# Patient Record
Sex: FEMALE | URBAN - METROPOLITAN AREA
[De-identification: names, ages, dates, MRNs, and addresses within clinical notes are randomized per-mention and may not be internally consistent; named-entity substitution may affect disease eponyms.]

---

## 2017-02-08 PROBLEM — R56.9 SEIZURE (HCC): Status: ACTIVE | Noted: 2017-02-08

## 2017-02-20 ENCOUNTER — OFFICE VISIT (OUTPATIENT)
Dept: PHYSICAL THERAPY | Age: 42
End: 2017-02-20
Attending: Other
Payer: COMMERCIAL

## 2017-02-20 DIAGNOSIS — I63.321 CEREBROVASCULAR ACCIDENT (CVA) DUE TO THROMBOSIS OF RIGHT ANTERIOR CEREBRAL ARTERY (HCC): Primary | ICD-10-CM

## 2017-02-20 DIAGNOSIS — R56.9 SEIZURE (HCC): ICD-10-CM

## 2017-02-20 DIAGNOSIS — I60.7 CEREBRAL ANEURYSM RUPTURE (HCC): ICD-10-CM

## 2017-02-20 PROCEDURE — 97163 PT EVAL HIGH COMPLEX 45 MIN: CPT

## 2017-02-20 PROCEDURE — 97530 THERAPEUTIC ACTIVITIES: CPT

## 2017-02-20 NOTE — PROGRESS NOTES
NEUROLOGICAL EVALUATION:   Referring Physician: Dr. Ra Salazar  Diagnosis: L UE/LE weakness and impaired gait s/p CVA     Date of Service: 2/20/2017     PATIENT SUMMARY   Edgar Rogers is a 37year old y/o female who presents to therapy today following R ant improve strength and stability with walking, bed mobility, and taking care of her housework. Past medical history was reviewed with Harika.  Significant findings include high BP, angina, depression, blood clots, hemianopsia, anemia, gallbladder problems, w Assistance   Bed Mobility MOD I with increased time to perform and compensatory behavior to control L LE   Supine --> Sit MOD I with increased time    Sit --> Supine MOD I with trunk ROT to R to pull up L LE   Sit --> Stand MOD I with KAFO with INC R wt sh visits)  · Pt will improve functional hip strength to demonstrate ability to ascend/descend 1 flight of stairs reciprocally with KAFO (12 visits)  · Pt will be independent and compliant with comprehensive HEP to maintain progress achieved in PT (12 visits)

## 2017-02-23 ENCOUNTER — APPOINTMENT (OUTPATIENT)
Dept: PHYSICAL THERAPY | Age: 42
End: 2017-02-23
Attending: Other
Payer: COMMERCIAL

## 2017-02-28 ENCOUNTER — OFFICE VISIT (OUTPATIENT)
Dept: PHYSICAL THERAPY | Age: 42
End: 2017-02-28
Attending: Other
Payer: COMMERCIAL

## 2017-02-28 PROCEDURE — 97116 GAIT TRAINING THERAPY: CPT

## 2017-02-28 PROCEDURE — 97110 THERAPEUTIC EXERCISES: CPT

## 2017-02-28 PROCEDURE — 97112 NEUROMUSCULAR REEDUCATION: CPT

## 2017-02-28 NOTE — PROGRESS NOTES
Dx: L UE/LE weakness and impaired gait s/p CVA Oct 2016         Authorized # of Visits:  HMO ([id number])         Next MD visit: couple months  Fall Risk: standard         Precautions: n/a             Subjective: Feeling ok today.     Objective: MMT L ankle DF 1/5 strengthening without KAFO. Progress gait training without KAFO    Date: 2/28/2017  Tx#: 2/12 Date: Tx#: 3/ Date: Tx#: 4/ Date: Tx#: 5/ Date: Tx#: 6/ Date: Tx#: 7/ Date:    Tx#: 8/   Gait on treadmill with KAFO 1.1 mph x3 min         4 inch step u

## 2017-03-08 ENCOUNTER — OFFICE VISIT (OUTPATIENT)
Dept: PHYSICAL THERAPY | Age: 42
End: 2017-03-08
Attending: Other
Payer: COMMERCIAL

## 2017-03-08 PROCEDURE — 97014 ELECTRIC STIMULATION THERAPY: CPT

## 2017-03-08 PROCEDURE — 97110 THERAPEUTIC EXERCISES: CPT

## 2017-03-08 PROCEDURE — 97112 NEUROMUSCULAR REEDUCATION: CPT

## 2017-03-08 PROCEDURE — 97116 GAIT TRAINING THERAPY: CPT

## 2017-03-08 NOTE — PROGRESS NOTES
Dx: L UE/LE weakness and impaired gait s/p CVA Oct 2016         Authorized # of Visits:  HMO ([id number])         Next MD visit: couple months  Fall Risk: standard         Precautions: n/a             Subjective: Felt ok after last PT session; not too sore or tire maintain progress achieved in PT (12 visits) -progress    Plan: progress gait and L LE strengthening as able. Date: 2/28/2017  Tx#: 2/12 Date: 3/8/2017  Tx#: 3/12 Date: Tx#: 4/ Date: Tx#: 5/ Date: Tx#: 6/ Date: Tx#: 7/ Date:    Tx#: 8/   Gait on

## 2017-03-09 ENCOUNTER — OFFICE VISIT (OUTPATIENT)
Dept: PHYSICAL THERAPY | Age: 42
End: 2017-03-09
Attending: Other
Payer: COMMERCIAL

## 2017-03-09 PROCEDURE — 97032 APPL MODALITY 1+ESTIM EA 15: CPT

## 2017-03-09 PROCEDURE — 97112 NEUROMUSCULAR REEDUCATION: CPT

## 2017-03-09 PROCEDURE — 97116 GAIT TRAINING THERAPY: CPT

## 2017-03-09 PROCEDURE — 97110 THERAPEUTIC EXERCISES: CPT

## 2017-03-09 NOTE — PROGRESS NOTES
Dx: L UE/LE weakness and impaired gait s/p CVA Oct 2016         Authorized # of Visits:  HMO ([id number])         Next MD visit: couple months  Fall Risk: standard         Precautions: n/a             Subjective: Was not sore after yesterday's PT session.      Primo Martinez 2/28/2017  Tx#: 2/12 Date: 3/8/2017  Tx#: 3/12 Date: 3/9/2017  Tx#: 4/12 Date: Tx#: 5/ Date: Tx#: 6/ Date: Tx#: 7/ Date:    Tx#: 8/   Gait on treadmill with KAFO 1.1 mph x3 min Gait training without KAFO - 50 ft x 2 laps (SBA)  -high knees x50 ft  -bu with lunges onto airex. Continued body awareness training with mirror feedback with sit<>stands and tactile cuing at the hips and shoulders to avoid R wt shift/trunk lean.  Continued FNMES for anterior tib retraining  Reviewed HEP* with lateral stepping RTB

## 2017-03-13 ENCOUNTER — OFFICE VISIT (OUTPATIENT)
Dept: PHYSICAL THERAPY | Age: 42
End: 2017-03-13
Attending: Other
Payer: COMMERCIAL

## 2017-03-13 PROCEDURE — 97116 GAIT TRAINING THERAPY: CPT

## 2017-03-13 PROCEDURE — 97530 THERAPEUTIC ACTIVITIES: CPT

## 2017-03-13 PROCEDURE — 97110 THERAPEUTIC EXERCISES: CPT

## 2017-03-13 PROCEDURE — 97112 NEUROMUSCULAR REEDUCATION: CPT

## 2017-03-13 NOTE — PROGRESS NOTES
Dx: L UE/LE weakness and impaired gait s/p CVA Oct 2016         Authorized # of Visits:  HMO ([id number])         Next MD visit: couple months  Fall Risk: standard         Precautions: n/a             Subjective: Feels a little stronger with walking around the [employer] after her 1year old without loss of stability (8 visits) -progress  · Pt will improve L shld strength to 4+/5 to be able to lift heavy pans in the kitchen without difficulty (12 visits) -progress  · Pt will improve functional hip strength to demonstrate a 8 inch DF CKC TC glide and calf stretch x15 (able to bring knee 3 finger past toes, but knee sore)  -with RTB L knee ABD x10 Tandem stance 2x30s ea (SBA) Clams 2x10 L min A 2x10 ea      RTB at knees side step //bars with slight knee bend x2 laps* x2 lap

## 2017-03-16 ENCOUNTER — OFFICE VISIT (OUTPATIENT)
Dept: PHYSICAL THERAPY | Age: 42
End: 2017-03-16
Attending: Other
Payer: COMMERCIAL

## 2017-03-16 PROCEDURE — 97110 THERAPEUTIC EXERCISES: CPT

## 2017-03-16 PROCEDURE — 97112 NEUROMUSCULAR REEDUCATION: CPT

## 2017-03-16 PROCEDURE — 97032 APPL MODALITY 1+ESTIM EA 15: CPT

## 2017-03-16 NOTE — PROGRESS NOTES
Dx: L UE/LE weakness and impaired gait s/p CVA Oct 2016         Authorized # of Visits:  HMO (12)         Next MD visit: couple months  Fall Risk: standard         Precautions: n/a             Subjective: Pt is feeling good today.  Yesterday she had a very reciprocally with KAFO (12 visits) -progress  · Pt will be independent and compliant with comprehensive HEP to maintain progress achieved in PT (12 visits) -progress    Plan: review home FNMES use     Date: 2/28/2017  Tx#: 2/12 Date: 3/8/2017  Tx#: 3/12 Lenny min A 2x10 ea -     RTB at knees side step //bars with slight knee bend x2 laps* x2 laps SB wall squat with GTB at knees x8 (poor response) - -     slant board L foot tap up x15 (for functional DF) [place]alese L anterior tib with AROM DF 50-10 cycle (22 intens

## 2017-03-23 ENCOUNTER — OFFICE VISIT (OUTPATIENT)
Dept: PHYSICAL THERAPY | Age: 42
End: 2017-03-23
Attending: Other
Payer: COMMERCIAL

## 2017-03-23 PROCEDURE — 97112 NEUROMUSCULAR REEDUCATION: CPT

## 2017-03-23 PROCEDURE — 97110 THERAPEUTIC EXERCISES: CPT

## 2017-03-23 PROCEDURE — 97116 GAIT TRAINING THERAPY: CPT

## 2017-03-23 NOTE — PROGRESS NOTES
Dx: L UE/LE weakness and impaired gait s/p CVA Oct 2016         Authorized # of Visits:  HMO ([id number])         Next MD visit: couple months  Fall Risk: standard         Precautions: n/a             Subjective: Sometimes feels good; sometimes not good (dizziness demonstrate ability to ascend/descend 1 flight of stairs reciprocally with KAFO (12 visits) -progress  · Pt will be independent and compliant with comprehensive HEP to maintain progress achieved in PT (12 visits) -progress    Plan: review updated HEP; asse curl x10 Sit<>stand with ball toss x15 (CGA) Single leg sit<>stand from elevated plinth ([facility]) x8 ea (toe touch L)    8 inch DF CKC TC glide and calf stretch x15 (able to bring knee 3 finger past toes, but knee sore)  -with RTB L knee ABD x10 Tandem stance

## 2017-03-27 ENCOUNTER — OFFICE VISIT (OUTPATIENT)
Dept: PHYSICAL THERAPY | Age: 42
End: 2017-03-27
Attending: Other
Payer: COMMERCIAL

## 2017-03-27 NOTE — PROGRESS NOTES
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 Pt and  report understanding. No further therapy provided.

## 2017-03-29 ENCOUNTER — OFFICE VISIT (OUTPATIENT)
Dept: PHYSICAL THERAPY | Age: 42
End: 2017-03-29
Attending: Other
Payer: COMMERCIAL

## 2017-03-29 PROCEDURE — 97110 THERAPEUTIC EXERCISES: CPT

## 2017-03-29 PROCEDURE — 97116 GAIT TRAINING THERAPY: CPT

## 2017-03-29 PROCEDURE — 97112 NEUROMUSCULAR REEDUCATION: CPT

## 2017-03-29 NOTE — PROGRESS NOTES
Dx: L UE/LE weakness and impaired gait s/p CVA Oct 2016         Authorized # of Visits:  HMO ([id number])         Next MD visit: April 19, 2017 (neurologist)  Fall Risk: standard         Precautions: CVA of R anterior cerebral artery            Subjective: Pt repo Pt does demonstrate significant improvement in L LE stability with gait; no evidence of L knee buckling or foot drop with gait; decreased fluidity of control with increased in gait speed. Continues to have decreased L LE proprioception with cone taps, sit<> Standing LINDSAY heel raise x10 Gait over airex x2 and 4 inch step -x2 laps 6 inch step up 2 UE L lead without KAFO x8 FWD x10 L  -Step down x10 R lead  -LAT up and over x6 laps - Stairs  1 flight x2 with 1 HR, reciprocal; SBA   4 inch no KAFO 2 UE  -step up L CKC) Gait with cone taps (6) x2 laps (CGA) (difficulty with L LE foot placement/proprioception)   slant board L foot tap up x15 (for functional DF) [place] L anterior tib with AROM DF 50-10 cycle (22 intensity) x12 min 30-10 cycle x15 min (able to raise up

## 2017-03-30 ENCOUNTER — TELEPHONE (OUTPATIENT)
Dept: PHYSICAL THERAPY | Age: 42
End: 2017-03-30

## 2017-03-30 ENCOUNTER — APPOINTMENT (OUTPATIENT)
Dept: PHYSICAL THERAPY | Age: 42
End: 2017-03-30
Attending: Other
Payer: COMMERCIAL

## 2017-03-30 NOTE — TELEPHONE ENCOUNTER
Attempted to call Dr. Ishmael Pineda when pt was present during last 2 PT sessions; but both dates MD office was closed and does not have answering service.  Per pt request, called to discuss pt's continued high resting BP despite current medication regime and pt r

## 2017-04-03 ENCOUNTER — TELEPHONE (OUTPATIENT)
Dept: PHYSICAL THERAPY | Age: 42
End: 2017-04-03

## 2017-04-03 NOTE — TELEPHONE ENCOUNTER
Pt did not show up to PT appt. Called to follow up. Pt's  reports she was told by the PCP office to hold therapy until after her office appt with PCP tomorrow.  Pt was under the impression that doctor's office had given the message to PT.

## 2017-04-03 NOTE — TELEPHONE ENCOUNTER
Per neurologist office, Dr. Mike Peterson is out of the office until April 6 - therapist and pt should discuss BP issues with PCP office. Called Dr. Gregg Friend office, spoke with Jacy Boo.  Jacy Boo reports she will discuss the issue with Dr. José Antonio Mckinney and try to get pt into

## 2017-04-05 ENCOUNTER — APPOINTMENT (OUTPATIENT)
Dept: PHYSICAL THERAPY | Age: 42
End: 2017-04-05
Attending: Other
Payer: COMMERCIAL

## 2017-04-05 ENCOUNTER — TELEPHONE (OUTPATIENT)
Dept: PHYSICAL THERAPY | Age: 42
End: 2017-04-05

## 2017-04-05 NOTE — TELEPHONE ENCOUNTER
Yael Bloom from PCP office called to follow up; left message stating that Sherita Parish was seen in their office yesterday for her BP. Per Yael Bloom, pt will hold PT this week, but return next week.

## 2017-04-10 ENCOUNTER — TELEPHONE (OUTPATIENT)
Dept: PHYSICAL THERAPY | Age: 42
End: 2017-04-10

## 2017-04-10 NOTE — TELEPHONE ENCOUNTER
No show. Called to follow up/confirm next visit. Pt's  reports they thought that they were not supposed to attend therapy this week due to her high blood pressure.  They thought they were supposed to wait for PCP office to call and clear her for ther

## 2017-04-12 ENCOUNTER — TELEPHONE (OUTPATIENT)
Dept: PHYSICAL THERAPY | Age: 42
End: 2017-04-12

## 2017-04-12 ENCOUNTER — OFFICE VISIT (OUTPATIENT)
Dept: PHYSICAL THERAPY | Age: 42
End: 2017-04-12
Attending: Other
Payer: COMMERCIAL

## 2017-04-12 PROCEDURE — 97116 GAIT TRAINING THERAPY: CPT

## 2017-04-12 PROCEDURE — 97530 THERAPEUTIC ACTIVITIES: CPT

## 2017-04-12 PROCEDURE — 97112 NEUROMUSCULAR REEDUCATION: CPT

## 2017-04-12 NOTE — TELEPHONE ENCOUNTER
Attempted to call PCP office back to discuss pt's recent BP issues and any precautions to exercise. Staff is not in the office on Wed.

## 2017-04-12 NOTE — TELEPHONE ENCOUNTER
Cathleen Moya from Dr. Velasquez Garcia office left message. Pt had misunderstood that she was able to return to PT this week. Cathleen Moya reports the pt did state her BP was high over the weekend, but could not recall the numbers from the reading.  Cathleen Moya  Will try to get in t

## 2017-04-12 NOTE — PROGRESS NOTES
Dx: L UE/LE weakness and impaired gait s/p CVA Oct 2016         Authorized # of Visits:  HMO (12)         Next MD visit: April 19, 2017 (neurologist)  Fall Risk: standard         Precautions: CVA of R anterior cerebral artery            Subjective: PCP off pinching sensation near L eye. Happens sometimes at home with picking up stuff from the ground; improves with standing rest. Pt demonstrates significant improvement in symmetry with squat form and functional lifting; minimal R hip shift.  Continues to have lap      -x50 ft - Treadmill 1.1 mph>1.5 mph 2 UE x4 min (faitgue) - 1.4 mph x3 min   4 inch step up with KAFO and 2 UE x6 Standing LINDSAY heel raise x10 Gait over airex x2 and 4 inch step -x2 laps 6 inch step up 2 UE L lead without KAFO x8 FWD x10 L  -Step d knee 3 finger past toes, but knee sore)  -with RTB L knee ABD x10 Tandem stance 2x30s ea (SBA) Clams 2x10 L min A 2x10 ea - 4 inch tap down L 2 UE* x10  -1 UE 1x10 ea (fair) - Gait on color circles x5 min -various positions (wide to narrow GUTIERREZ)   RTB at kn

## 2017-04-13 ENCOUNTER — APPOINTMENT (OUTPATIENT)
Dept: PHYSICAL THERAPY | Age: 42
End: 2017-04-13
Attending: Other
Payer: COMMERCIAL

## 2017-04-20 ENCOUNTER — APPOINTMENT (OUTPATIENT)
Dept: PHYSICAL THERAPY | Age: 42
End: 2017-04-20
Attending: Other
Payer: COMMERCIAL

## 2017-04-27 ENCOUNTER — APPOINTMENT (OUTPATIENT)
Dept: PHYSICAL THERAPY | Age: 42
End: 2017-04-27
Attending: Other
Payer: COMMERCIAL

## 2017-05-15 ENCOUNTER — TELEPHONE (OUTPATIENT)
Dept: PHYSICAL THERAPY | Age: 42
End: 2017-05-15

## 2017-05-15 NOTE — TELEPHONE ENCOUNTER
Called pt's  to discuss plan for continued PT. Pt had to cancel several previous visits due to their son being in the hospital, but son is better now. Informed  of several evening openings this week.  He reports he will talk to formerly [facility] and call

## 2017-08-23 PROBLEM — R51.9 CHRONIC INTRACTABLE HEADACHE, UNSPECIFIED HEADACHE TYPE: Status: ACTIVE | Noted: 2017-08-23

## 2017-08-23 PROBLEM — G89.29 CHRONIC INTRACTABLE HEADACHE, UNSPECIFIED HEADACHE TYPE: Status: ACTIVE | Noted: 2017-08-23

## 2018-12-06 LAB — HM MAMMOGRAPHY BILATERAL: NORMAL

## 2021-07-01 LAB — CYTOLOGY CVX/VAG DOC THIN PREP: NORMAL

## 2022-08-03 ENCOUNTER — OFFICE VISIT (OUTPATIENT)
Dept: INTERNAL MEDICINE CLINIC | Facility: CLINIC | Age: 47
End: 2022-08-03
Payer: COMMERCIAL

## 2022-08-03 VITALS
HEIGHT: 60.63 IN | SYSTOLIC BLOOD PRESSURE: 140 MMHG | HEART RATE: 73 BPM | OXYGEN SATURATION: 98 % | RESPIRATION RATE: 16 BRPM | DIASTOLIC BLOOD PRESSURE: 90 MMHG | TEMPERATURE: 98 F | WEIGHT: 175.81 LBS | BODY MASS INDEX: 33.63 KG/M2

## 2022-08-03 DIAGNOSIS — I69.398 WEAKNESS FOLLOWING CEREBROVASCULAR ACCIDENT (CVA): ICD-10-CM

## 2022-08-03 DIAGNOSIS — Z91.89 AT RISK FOR SEIZURES: ICD-10-CM

## 2022-08-03 DIAGNOSIS — R12 HEARTBURN: ICD-10-CM

## 2022-08-03 DIAGNOSIS — Z12.11 COLON CANCER SCREENING: ICD-10-CM

## 2022-08-03 DIAGNOSIS — R53.1 WEAKNESS FOLLOWING CEREBROVASCULAR ACCIDENT (CVA): ICD-10-CM

## 2022-08-03 DIAGNOSIS — I63.321 CEREBROVASCULAR ACCIDENT (CVA) DUE TO THROMBOSIS OF RIGHT ANTERIOR CEREBRAL ARTERY (HCC): Primary | ICD-10-CM

## 2022-08-03 DIAGNOSIS — I10 ESSENTIAL HYPERTENSION: ICD-10-CM

## 2022-08-03 DIAGNOSIS — Z12.31 ENCOUNTER FOR SCREENING MAMMOGRAM FOR MALIGNANT NEOPLASM OF BREAST: ICD-10-CM

## 2022-08-03 DIAGNOSIS — F32.A DEPRESSION, UNSPECIFIED DEPRESSION TYPE: ICD-10-CM

## 2022-08-03 DIAGNOSIS — Z01.89 ROUTINE LAB DRAW: ICD-10-CM

## 2022-08-03 DIAGNOSIS — I60.7 CEREBRAL ANEURYSM RUPTURE (HCC): ICD-10-CM

## 2022-08-03 PROBLEM — I72.9 ANEURYSM (HCC): Status: ACTIVE | Noted: 2021-03-09

## 2022-08-03 PROCEDURE — 99204 OFFICE O/P NEW MOD 45 MIN: CPT | Performed by: INTERNAL MEDICINE

## 2022-08-03 PROCEDURE — 3080F DIAST BP >= 90 MM HG: CPT | Performed by: INTERNAL MEDICINE

## 2022-08-03 PROCEDURE — 3008F BODY MASS INDEX DOCD: CPT | Performed by: INTERNAL MEDICINE

## 2022-08-03 PROCEDURE — 3077F SYST BP >= 140 MM HG: CPT | Performed by: INTERNAL MEDICINE

## 2022-08-03 RX ORDER — AMLODIPINE BESYLATE 10 MG/1
10 TABLET ORAL DAILY
Qty: 90 TABLET | Refills: 3 | Status: SHIPPED | OUTPATIENT
Start: 2022-08-03

## 2022-08-03 RX ORDER — NICOTINE POLACRILEX 4 MG/1
1 GUM, CHEWING ORAL DAILY
Qty: 90 TABLET | Refills: 0 | Status: SHIPPED | OUTPATIENT
Start: 2022-08-03

## 2022-08-03 RX ORDER — ADHESIVE BANDAGE 3/4"
BANDAGE TOPICAL
Qty: 1 EACH | Refills: 0 | Status: SHIPPED | OUTPATIENT
Start: 2022-08-03

## 2022-08-04 ENCOUNTER — TELEPHONE (OUTPATIENT)
Dept: INTERNAL MEDICINE CLINIC | Facility: CLINIC | Age: 47
End: 2022-08-04

## 2022-08-04 NOTE — TELEPHONE ENCOUNTER
Outgoing Fax to [facility] of Medical Records. Received Confirmation Report made a copy original sent to scan and copy was placed in DV accordion.

## 2022-08-24 ENCOUNTER — TELEPHONE (OUTPATIENT)
Dept: INTERNAL MEDICINE CLINIC | Facility: CLINIC | Age: 47
End: 2022-08-24

## 2022-08-24 DIAGNOSIS — I72.9 ANEURYSM (HCC): Primary | ICD-10-CM

## 2022-08-24 DIAGNOSIS — Z86.73 HISTORY OF CVA IN ADULTHOOD: ICD-10-CM

## 2022-08-24 DIAGNOSIS — Z29.8 SEIZURE PROPHYLAXIS: ICD-10-CM

## 2022-08-24 NOTE — TELEPHONE ENCOUNTER
Please let her know that we received OSH general notes, but last from 7/2021. She stated that she had more recent follow-up with neurology/neurovascular team.   It is important that she follow-up with neurology/neurosurgery regarding aneurysm hx and CVA hx. Per report they had ordered to repeat MRA to monitor. Please ask if she got that done in 2022 and if so where/when? She needs to follow-up with neurology/neurovascular team and have them send us updated notes. Last imaging noted that we have is from 2/18/2021 at [facility] [place]. She should be getting a MRA if she hasn't already this year. Please let me know. In addition, if no longer following with neurosurgery team regarding aneurysm; then a new referral was placed now for Dr. Norma Friend. Important to follow-up with neurosurgery team.   Should also see general neuro; referral placed. Should also complete lab orders that were previously placed.

## 2022-08-25 NOTE — TELEPHONE ENCOUNTER
Since she did not complete a MRA at OSH in 2022 yet, I ordered a MRA brain.  Should complete asap and make appt with neurosurgeon and neurologist.

## 2022-08-25 NOTE — TELEPHONE ENCOUNTER
Spoke with patient. States she last had an MRA/imaging in 2021. States she was told by previous Neuro they could no longer see her but she is unsure why. Provided numbers to neurosurgery & neurology per DV referrals. Also advised of lab orders. Verbalized understanding.

## 2022-09-14 ENCOUNTER — LAB ENCOUNTER (OUTPATIENT)
Dept: LAB | Age: 47
End: 2022-09-14
Attending: INTERNAL MEDICINE
Payer: COMMERCIAL

## 2022-09-14 DIAGNOSIS — I60.7 CEREBRAL ANEURYSM RUPTURE (HCC): ICD-10-CM

## 2022-09-14 DIAGNOSIS — D64.9 ANEMIA, UNSPECIFIED TYPE: Primary | ICD-10-CM

## 2022-09-14 DIAGNOSIS — D64.9 ANEMIA, UNSPECIFIED TYPE: ICD-10-CM

## 2022-09-14 DIAGNOSIS — I63.321 CEREBROVASCULAR ACCIDENT (CVA) DUE TO THROMBOSIS OF RIGHT ANTERIOR CEREBRAL ARTERY (HCC): ICD-10-CM

## 2022-09-14 DIAGNOSIS — Z01.89 ROUTINE LAB DRAW: ICD-10-CM

## 2022-09-14 LAB
ALBUMIN SERPL-MCNC: 3.5 G/DL (ref 3.4–5)
ALBUMIN/GLOB SERPL: 0.9 {RATIO} (ref 1–2)
ALP LIVER SERPL-CCNC: 84 U/L
ALT SERPL-CCNC: 28 U/L
ANION GAP SERPL CALC-SCNC: 5 MMOL/L (ref 0–18)
AST SERPL-CCNC: 20 U/L (ref 15–37)
BASOPHILS # BLD AUTO: 0.05 X10(3) UL (ref 0–0.2)
BASOPHILS NFR BLD AUTO: 0.7 %
BILIRUB SERPL-MCNC: 0.4 MG/DL (ref 0.1–2)
BUN BLD-MCNC: 12 MG/DL (ref 7–18)
CALCIUM BLD-MCNC: 8.7 MG/DL (ref 8.5–10.1)
CHLORIDE SERPL-SCNC: 110 MMOL/L (ref 98–112)
CHOLEST SERPL-MCNC: 166 MG/DL (ref ?–200)
CO2 SERPL-SCNC: 24 MMOL/L (ref 21–32)
CREAT BLD-MCNC: 0.66 MG/DL
DEPRECATED HBV CORE AB SER IA-ACNC: 2.9 NG/ML
EOSINOPHIL # BLD AUTO: 0.14 X10(3) UL (ref 0–0.7)
EOSINOPHIL NFR BLD AUTO: 1.8 %
ERYTHROCYTE [DISTWIDTH] IN BLOOD BY AUTOMATED COUNT: 19.5 %
EST. AVERAGE GLUCOSE BLD GHB EST-MCNC: 126 MG/DL (ref 68–126)
FASTING PATIENT LIPID ANSWER: YES
FASTING STATUS PATIENT QL REPORTED: YES
FOLATE SERPL-MCNC: 10.6 NG/ML (ref 8.7–?)
GFR SERPLBLD BASED ON 1.73 SQ M-ARVRAT: 109 ML/MIN/1.73M2 (ref 60–?)
GLOBULIN PLAS-MCNC: 3.7 G/DL (ref 2.8–4.4)
GLUCOSE BLD-MCNC: 97 MG/DL (ref 70–99)
HBA1C MFR BLD: 6 % (ref ?–5.7)
HCT VFR BLD AUTO: 31.4 %
HDLC SERPL-MCNC: 42 MG/DL (ref 40–59)
HGB BLD-MCNC: 9.1 G/DL
IMM GRANULOCYTES # BLD AUTO: 0.03 X10(3) UL (ref 0–1)
IMM GRANULOCYTES NFR BLD: 0.4 %
IRON SATN MFR SERPL: 4 %
IRON SERPL-MCNC: 17 UG/DL
LDLC SERPL CALC-MCNC: 88 MG/DL (ref ?–100)
LYMPHOCYTES # BLD AUTO: 2.1 X10(3) UL (ref 1–4)
LYMPHOCYTES NFR BLD AUTO: 27.6 %
MCH RBC QN AUTO: 20.8 PG (ref 26–34)
MCHC RBC AUTO-ENTMCNC: 29 G/DL (ref 31–37)
MCV RBC AUTO: 71.7 FL
MONOCYTES # BLD AUTO: 0.58 X10(3) UL (ref 0.1–1)
MONOCYTES NFR BLD AUTO: 7.6 %
NEUTROPHILS # BLD AUTO: 4.71 X10 (3) UL (ref 1.5–7.7)
NEUTROPHILS # BLD AUTO: 4.71 X10(3) UL (ref 1.5–7.7)
NEUTROPHILS NFR BLD AUTO: 61.9 %
NONHDLC SERPL-MCNC: 124 MG/DL (ref ?–130)
OSMOLALITY SERPL CALC.SUM OF ELEC: 288 MOSM/KG (ref 275–295)
PLATELET # BLD AUTO: 242 10(3)UL (ref 150–450)
POTASSIUM SERPL-SCNC: 4.3 MMOL/L (ref 3.5–5.1)
PROT SERPL-MCNC: 7.2 G/DL (ref 6.4–8.2)
RBC # BLD AUTO: 4.38 X10(6)UL
SODIUM SERPL-SCNC: 139 MMOL/L (ref 136–145)
TIBC SERPL-MCNC: 443 UG/DL (ref 240–450)
TRANSFERRIN SERPL-MCNC: 297 MG/DL (ref 200–360)
TRIGL SERPL-MCNC: 214 MG/DL (ref 30–149)
TSI SER-ACNC: 0.85 MIU/ML (ref 0.36–3.74)
VIT B12 SERPL-MCNC: 307 PG/ML (ref 193–986)
VIT D+METAB SERPL-MCNC: 23.3 NG/ML (ref 30–100)
VLDLC SERPL CALC-MCNC: 35 MG/DL (ref 0–30)
WBC # BLD AUTO: 7.6 X10(3) UL (ref 4–11)

## 2022-09-14 PROCEDURE — 83036 HEMOGLOBIN GLYCOSYLATED A1C: CPT

## 2022-09-14 PROCEDURE — 82746 ASSAY OF FOLIC ACID SERUM: CPT

## 2022-09-14 PROCEDURE — 82607 VITAMIN B-12: CPT

## 2022-09-14 PROCEDURE — 82306 VITAMIN D 25 HYDROXY: CPT

## 2022-09-14 PROCEDURE — 83540 ASSAY OF IRON: CPT

## 2022-09-14 PROCEDURE — 83550 IRON BINDING TEST: CPT

## 2022-09-14 PROCEDURE — 85025 COMPLETE CBC W/AUTO DIFF WBC: CPT

## 2022-09-14 PROCEDURE — 80061 LIPID PANEL: CPT

## 2022-09-14 PROCEDURE — 84443 ASSAY THYROID STIM HORMONE: CPT

## 2022-09-14 PROCEDURE — 36415 COLL VENOUS BLD VENIPUNCTURE: CPT

## 2022-09-14 PROCEDURE — 80053 COMPREHEN METABOLIC PANEL: CPT

## 2022-09-14 PROCEDURE — 82728 ASSAY OF FERRITIN: CPT

## 2022-09-15 DIAGNOSIS — D50.9 IRON DEFICIENCY ANEMIA, UNSPECIFIED IRON DEFICIENCY ANEMIA TYPE: Primary | ICD-10-CM

## 2022-09-15 DIAGNOSIS — Z12.11 COLON CANCER SCREENING: ICD-10-CM

## 2022-09-15 RX ORDER — PNV NO.95/FERROUS FUM/FOLIC AC 28MG-0.8MG
1 TABLET ORAL 2 TIMES DAILY
Qty: 180 TABLET | Refills: 0 | Status: SHIPPED | OUTPATIENT
Start: 2022-09-15

## 2022-09-15 RX ORDER — DOCUSATE SODIUM 100 MG/1
100 CAPSULE, LIQUID FILLED ORAL 2 TIMES DAILY
Qty: 180 CAPSULE | Refills: 0 | Status: SHIPPED | OUTPATIENT
Start: 2022-09-15

## 2022-11-08 ENCOUNTER — TELEPHONE (OUTPATIENT)
Dept: SCHEDULING | Age: 47
End: 2022-11-08

## 2022-11-08 DIAGNOSIS — I63.321 CEREBROVASCULAR ACCIDENT (CVA) DUE TO THROMBOSIS OF RIGHT ANTERIOR CEREBRAL ARTERY (HCC): ICD-10-CM

## 2022-11-08 DIAGNOSIS — I60.7 CEREBRAL ANEURYSM RUPTURE (HCC): ICD-10-CM

## 2022-11-08 DIAGNOSIS — I10 ESSENTIAL HYPERTENSION: ICD-10-CM

## 2022-11-08 DIAGNOSIS — R12 HEARTBURN: ICD-10-CM

## 2022-11-08 NOTE — TELEPHONE ENCOUNTER
Pt is requesting refills on meds listed below.  #[id number]      docusate sodium (COLACE) 100 MG Oral Cap 180 capsule     Ferrous Sulfate (IRON) 325 (65 Fe) MG Oral Tab 180 tablet     metoprolol tartrate 25 MG Oral Tab 360 tablet     Blood Pressure Monitoring (BLOOD PRESSURE CUFF) Does not apply Misc 1 each     Omeprazole 20 MG Oral Tab EC 90 tablet

## 2022-11-10 RX ORDER — ADHESIVE BANDAGE 3/4"
BANDAGE TOPICAL
Qty: 1 EACH | Refills: 0 | Status: SHIPPED | OUTPATIENT
Start: 2022-11-10

## 2022-11-10 RX ORDER — NICOTINE POLACRILEX 4 MG/1
1 GUM, CHEWING ORAL DAILY
Qty: 90 TABLET | Refills: 0 | Status: SHIPPED | OUTPATIENT
Start: 2022-11-10

## 2022-11-10 RX ORDER — DOCUSATE SODIUM 100 MG/1
100 CAPSULE, LIQUID FILLED ORAL 2 TIMES DAILY
Qty: 180 CAPSULE | Refills: 0 | Status: SHIPPED | OUTPATIENT
Start: 2022-11-10

## 2022-11-10 RX ORDER — PNV NO.95/FERROUS FUM/FOLIC AC 28MG-0.8MG
1 TABLET ORAL 2 TIMES DAILY
Qty: 180 TABLET | Refills: 0 | Status: SHIPPED | OUTPATIENT
Start: 2022-11-10

## 2022-11-22 ENCOUNTER — APPOINTMENT (OUTPATIENT)
Dept: FAMILY MEDICINE | Age: 47
End: 2022-11-22

## 2023-03-13 ENCOUNTER — NURSE TRIAGE (OUTPATIENT)
Dept: TELEHEALTH | Age: 48
End: 2023-03-13

## 2023-03-20 ENCOUNTER — APPOINTMENT (OUTPATIENT)
Dept: FAMILY MEDICINE | Age: 48
End: 2023-03-20

## 2023-03-28 ENCOUNTER — APPOINTMENT (OUTPATIENT)
Dept: FAMILY MEDICINE | Age: 48
End: 2023-03-28

## 2023-04-04 ENCOUNTER — OFFICE VISIT (OUTPATIENT)
Dept: FAMILY MEDICINE | Age: 48
End: 2023-04-04

## 2023-04-04 VITALS
WEIGHT: 172.73 LBS | OXYGEN SATURATION: 100 % | HEIGHT: 60 IN | DIASTOLIC BLOOD PRESSURE: 99 MMHG | HEART RATE: 83 BPM | SYSTOLIC BLOOD PRESSURE: 141 MMHG | BODY MASS INDEX: 33.91 KG/M2

## 2023-04-04 DIAGNOSIS — D50.8 OTHER IRON DEFICIENCY ANEMIA: ICD-10-CM

## 2023-04-04 DIAGNOSIS — Z86.59 H/O: DEPRESSION: ICD-10-CM

## 2023-04-04 DIAGNOSIS — I60.7 CEREBRAL ANEURYSM RUPTURE (CMD): ICD-10-CM

## 2023-04-04 DIAGNOSIS — Z00.01 ENCOUNTER FOR GENERAL ADULT MEDICAL EXAMINATION WITH ABNORMAL FINDINGS: Primary | ICD-10-CM

## 2023-04-04 DIAGNOSIS — I72.9 ANEURYSM (CMD): ICD-10-CM

## 2023-04-04 DIAGNOSIS — N92.0 EXCESSIVE AND FREQUENT MENSTRUATION: ICD-10-CM

## 2023-04-04 DIAGNOSIS — R26.89 POOR BALANCE: ICD-10-CM

## 2023-04-04 DIAGNOSIS — Z12.11 COLON CANCER SCREENING: ICD-10-CM

## 2023-04-04 DIAGNOSIS — Z86.79 HISTORY OF INTRACRANIAL HEMORRHAGE: ICD-10-CM

## 2023-04-04 DIAGNOSIS — G40.909 SEIZURE DISORDER (CMD): ICD-10-CM

## 2023-04-04 DIAGNOSIS — Z12.31 VISIT FOR SCREENING MAMMOGRAM: ICD-10-CM

## 2023-04-04 DIAGNOSIS — I10 ESSENTIAL HYPERTENSION: ICD-10-CM

## 2023-04-04 DIAGNOSIS — Z11.59 NEED FOR HEPATITIS C SCREENING TEST: ICD-10-CM

## 2023-04-04 PROCEDURE — 3077F SYST BP >= 140 MM HG: CPT | Performed by: FAMILY MEDICINE

## 2023-04-04 PROCEDURE — 99396 PREV VISIT EST AGE 40-64: CPT | Performed by: FAMILY MEDICINE

## 2023-04-04 PROCEDURE — 3080F DIAST BP >= 90 MM HG: CPT | Performed by: FAMILY MEDICINE

## 2023-04-04 RX ORDER — OMEPRAZOLE 20 MG/1
1 TABLET, DELAYED RELEASE ORAL DAILY
COMMUNITY
Start: 2022-11-10

## 2023-04-04 RX ORDER — METOPROLOL TARTRATE 50 MG/1
50 TABLET, FILM COATED ORAL 2 TIMES DAILY
Qty: 60 TABLET | Refills: 3 | Status: SHIPPED | OUTPATIENT
Start: 2023-04-04 | End: 2023-05-04

## 2023-04-04 RX ORDER — AMLODIPINE BESYLATE 10 MG/1
10 TABLET ORAL DAILY
COMMUNITY
Start: 2023-01-06

## 2023-04-04 RX ORDER — LEVETIRACETAM 500 MG/1
500 TABLET ORAL 2 TIMES DAILY
COMMUNITY
End: 2023-04-07 | Stop reason: SDUPTHER

## 2023-04-04 RX ORDER — FERROUS SULFATE 325(65) MG
1 TABLET ORAL DAILY
COMMUNITY
Start: 2022-11-10 | End: 2023-04-04 | Stop reason: SDUPTHER

## 2023-04-04 RX ORDER — FERROUS SULFATE 325(65) MG
1 TABLET ORAL DAILY
Qty: 30 TABLET | Refills: 5 | Status: SHIPPED | OUTPATIENT
Start: 2023-04-04 | End: 2023-05-04

## 2023-04-04 RX ORDER — METOPROLOL TARTRATE 50 MG/1
50 TABLET, FILM COATED ORAL 2 TIMES DAILY
COMMUNITY
End: 2023-04-04 | Stop reason: SDUPTHER

## 2023-04-04 ASSESSMENT — ENCOUNTER SYMPTOMS
GASTROINTESTINAL NEGATIVE: 1
CONSTITUTIONAL NEGATIVE: 1
NEUROLOGICAL NEGATIVE: 1
RESPIRATORY NEGATIVE: 1
PSYCHIATRIC NEGATIVE: 1

## 2023-04-04 ASSESSMENT — PATIENT HEALTH QUESTIONNAIRE - PHQ9
2. FEELING DOWN, DEPRESSED OR HOPELESS: SEVERAL DAYS
CLINICAL INTERPRETATION OF PHQ2 SCORE: NO FURTHER SCREENING NEEDED
SUM OF ALL RESPONSES TO PHQ9 QUESTIONS 1 AND 2: 2
SUM OF ALL RESPONSES TO PHQ9 QUESTIONS 1 AND 2: 2
1. LITTLE INTEREST OR PLEASURE IN DOING THINGS: SEVERAL DAYS

## 2023-04-07 RX ORDER — LEVETIRACETAM 500 MG/1
500 TABLET ORAL DAILY
Qty: 30 TABLET | Refills: 1 | Status: SHIPPED | OUTPATIENT
Start: 2023-04-07

## 2023-06-02 ENCOUNTER — TELEPHONE (OUTPATIENT)
Dept: FAMILY MEDICINE | Age: 48
End: 2023-06-02

## 2023-06-02 RX ORDER — METOPROLOL TARTRATE 50 MG/1
50 TABLET, FILM COATED ORAL 2 TIMES DAILY
Qty: 180 TABLET | Refills: 0 | Status: SHIPPED | OUTPATIENT
Start: 2023-06-02 | End: 2023-08-31

## 2024-01-08 RX ORDER — METOPROLOL TARTRATE 50 MG/1
50 TABLET, FILM COATED ORAL 2 TIMES DAILY
Qty: 60 TABLET | Refills: 0 | Status: SHIPPED | OUTPATIENT
Start: 2024-01-08

## 2024-01-17 DIAGNOSIS — D50.8 OTHER IRON DEFICIENCY ANEMIA: ICD-10-CM

## 2024-01-17 RX ORDER — METOPROLOL TARTRATE 50 MG/1
50 TABLET, FILM COATED ORAL 2 TIMES DAILY
Qty: 60 TABLET | Refills: 0 | Status: SHIPPED | OUTPATIENT
Start: 2024-01-17

## 2024-01-17 RX ORDER — FERROUS SULFATE 325(65) MG
1 TABLET ORAL DAILY
Qty: 90 TABLET | Refills: 0 | Status: SHIPPED | OUTPATIENT
Start: 2024-01-17 | End: 2024-04-16

## 2024-01-17 RX ORDER — LEVETIRACETAM 500 MG/1
500 TABLET ORAL DAILY
Qty: 30 TABLET | Refills: 1 | Status: SHIPPED | OUTPATIENT
Start: 2024-01-17

## 2024-01-17 RX ORDER — AMLODIPINE BESYLATE 10 MG/1
10 TABLET ORAL DAILY
Qty: 90 TABLET | Refills: 0 | Status: SHIPPED | OUTPATIENT
Start: 2024-01-17 | End: 2024-04-16

## 2024-02-19 RX ORDER — METOPROLOL TARTRATE 50 MG/1
50 TABLET, FILM COATED ORAL 2 TIMES DAILY
Qty: 60 TABLET | Refills: 0 | Status: SHIPPED | OUTPATIENT
Start: 2024-02-19

## 2024-02-22 ENCOUNTER — APPOINTMENT (OUTPATIENT)
Dept: FAMILY MEDICINE | Age: 49
End: 2024-02-22

## 2024-02-27 ENCOUNTER — APPOINTMENT (OUTPATIENT)
Dept: FAMILY MEDICINE | Age: 49
End: 2024-02-27

## 2024-02-27 VITALS
WEIGHT: 173.06 LBS | HEIGHT: 60 IN | HEART RATE: 83 BPM | OXYGEN SATURATION: 98 % | BODY MASS INDEX: 33.98 KG/M2 | SYSTOLIC BLOOD PRESSURE: 121 MMHG | DIASTOLIC BLOOD PRESSURE: 81 MMHG

## 2024-02-27 DIAGNOSIS — I72.9 ANEURYSM (CMD): ICD-10-CM

## 2024-02-27 DIAGNOSIS — Z12.31 VISIT FOR SCREENING MAMMOGRAM: ICD-10-CM

## 2024-02-27 DIAGNOSIS — I10 ESSENTIAL HYPERTENSION: ICD-10-CM

## 2024-02-27 DIAGNOSIS — Z00.00 ENCOUNTER FOR GENERAL ADULT MEDICAL EXAMINATION W/O ABNORMAL FINDINGS: Primary | ICD-10-CM

## 2024-02-27 DIAGNOSIS — Z12.11 SCREENING FOR COLON CANCER: ICD-10-CM

## 2024-02-27 DIAGNOSIS — G40.909 SEIZURE DISORDER (CMD): ICD-10-CM

## 2024-02-27 DIAGNOSIS — Z86.59 H/O: DEPRESSION: ICD-10-CM

## 2024-02-27 PROCEDURE — 99396 PREV VISIT EST AGE 40-64: CPT | Performed by: FAMILY MEDICINE

## 2024-02-27 PROCEDURE — 3079F DIAST BP 80-89 MM HG: CPT | Performed by: FAMILY MEDICINE

## 2024-02-27 PROCEDURE — 3074F SYST BP LT 130 MM HG: CPT | Performed by: FAMILY MEDICINE

## 2024-02-27 ASSESSMENT — PATIENT HEALTH QUESTIONNAIRE - PHQ9
2. FEELING DOWN, DEPRESSED OR HOPELESS: NOT AT ALL
SUM OF ALL RESPONSES TO PHQ9 QUESTIONS 1 AND 2: 0
1. LITTLE INTEREST OR PLEASURE IN DOING THINGS: NOT AT ALL
SUM OF ALL RESPONSES TO PHQ9 QUESTIONS 1 AND 2: 0
CLINICAL INTERPRETATION OF PHQ2 SCORE: NO FURTHER SCREENING NEEDED

## 2024-02-27 ASSESSMENT — ENCOUNTER SYMPTOMS
NEUROLOGICAL NEGATIVE: 1
RESPIRATORY NEGATIVE: 1
GASTROINTESTINAL NEGATIVE: 1
CONSTITUTIONAL NEGATIVE: 1
PSYCHIATRIC NEGATIVE: 1

## 2024-03-04 ENCOUNTER — EXTERNAL RECORD (OUTPATIENT)
Dept: HEALTH INFORMATION MANAGEMENT | Facility: OTHER | Age: 49
End: 2024-03-04

## 2024-03-04 LAB
CASE RPRT: NORMAL
CLINICAL INFO: NORMAL
CYTOLOGY CVX/VAG STUDY: NORMAL
HPV16+18+45 E6+E7MRNA CVX NAA+PROBE: NEGATIVE
Lab: NORMAL
PAP EDUCATIONAL NOTE: NORMAL
SPECIMEN ADEQUACY: NORMAL

## 2024-03-19 ENCOUNTER — TELEPHONE (OUTPATIENT)
Dept: FAMILY MEDICINE | Age: 49
End: 2024-03-19

## 2024-03-27 ENCOUNTER — LAB SERVICES (OUTPATIENT)
Dept: LAB | Age: 49
End: 2024-03-27

## 2024-03-27 DIAGNOSIS — Z00.01 ENCOUNTER FOR GENERAL ADULT MEDICAL EXAMINATION WITH ABNORMAL FINDINGS: ICD-10-CM

## 2024-03-27 DIAGNOSIS — Z00.00 ENCOUNTER FOR GENERAL ADULT MEDICAL EXAMINATION W/O ABNORMAL FINDINGS: ICD-10-CM

## 2024-03-27 DIAGNOSIS — Z12.11 SCREENING FOR COLON CANCER: ICD-10-CM

## 2024-03-27 PROCEDURE — 80061 LIPID PANEL: CPT | Performed by: CLINICAL MEDICAL LABORATORY

## 2024-03-27 PROCEDURE — 82274 ASSAY TEST FOR BLOOD FECAL: CPT | Performed by: CLINICAL MEDICAL LABORATORY

## 2024-03-27 PROCEDURE — 36415 COLL VENOUS BLD VENIPUNCTURE: CPT | Performed by: FAMILY MEDICINE

## 2024-03-27 PROCEDURE — 80050 GENERAL HEALTH PANEL: CPT | Performed by: CLINICAL MEDICAL LABORATORY

## 2024-03-28 DIAGNOSIS — D50.8 OTHER IRON DEFICIENCY ANEMIA: ICD-10-CM

## 2024-03-28 LAB
ALBUMIN SERPL-MCNC: 3.7 G/DL (ref 3.6–5.1)
ALBUMIN/GLOB SERPL: 1.1 {RATIO} (ref 1–2.4)
ALP SERPL-CCNC: 98 UNITS/L (ref 45–117)
ALT SERPL-CCNC: 18 UNITS/L
ANION GAP SERPL CALC-SCNC: 10 MMOL/L (ref 7–19)
AST SERPL-CCNC: 11 UNITS/L
BASOPHILS # BLD: 0.1 K/MCL (ref 0–0.3)
BASOPHILS NFR BLD: 1 %
BILIRUB SERPL-MCNC: 0.4 MG/DL (ref 0.2–1)
BUN SERPL-MCNC: 10 MG/DL (ref 6–20)
BUN/CREAT SERPL: 18 (ref 7–25)
CALCIUM SERPL-MCNC: 8.8 MG/DL (ref 8.4–10.2)
CHLORIDE SERPL-SCNC: 110 MMOL/L (ref 97–110)
CHOLEST SERPL-MCNC: 167 MG/DL
CHOLEST/HDLC SERPL: 3.5 {RATIO}
CO2 SERPL-SCNC: 24 MMOL/L (ref 21–32)
CREAT SERPL-MCNC: 0.57 MG/DL (ref 0.51–0.95)
DEPRECATED RDW RBC: 47.1 FL (ref 39–50)
EGFRCR SERPLBLD CKD-EPI 2021: >90 ML/MIN/{1.73_M2}
EOSINOPHIL # BLD: 0.1 K/MCL (ref 0–0.5)
EOSINOPHIL NFR BLD: 2 %
ERYTHROCYTE [DISTWIDTH] IN BLOOD: 18.2 % (ref 11–15)
FASTING DURATION TIME PATIENT: 10 HOURS (ref 0–999)
GLOBULIN SER-MCNC: 3.5 G/DL (ref 2–4)
GLUCOSE SERPL-MCNC: 99 MG/DL (ref 70–99)
HCT VFR BLD CALC: 34.5 % (ref 36–46.5)
HDLC SERPL-MCNC: 48 MG/DL
HEMOCCULT STL QL: NEGATIVE
HGB BLD-MCNC: 10.5 G/DL (ref 12–15.5)
IMM GRANULOCYTES # BLD AUTO: 0 K/MCL (ref 0–0.2)
IMM GRANULOCYTES # BLD: 0 %
LDLC SERPL CALC-MCNC: 89 MG/DL
LYMPHOCYTES # BLD: 2.3 K/MCL (ref 1–4.8)
LYMPHOCYTES NFR BLD: 27 %
MCH RBC QN AUTO: 22.5 PG (ref 26–34)
MCHC RBC AUTO-ENTMCNC: 30.4 G/DL (ref 32–36.5)
MCV RBC AUTO: 73.9 FL (ref 78–100)
MONOCYTES # BLD: 0.7 K/MCL (ref 0.3–0.9)
MONOCYTES NFR BLD: 8 %
NEUTROPHILS # BLD: 5.5 K/MCL (ref 1.8–7.7)
NEUTROPHILS NFR BLD: 62 %
NONHDLC SERPL-MCNC: 119 MG/DL
NRBC BLD MANUAL-RTO: 0 /100 WBC
PLATELET # BLD AUTO: 264 K/MCL (ref 140–450)
POTASSIUM SERPL-SCNC: 4.2 MMOL/L (ref 3.4–5.1)
PROT SERPL-MCNC: 7.2 G/DL (ref 6.4–8.2)
RBC # BLD: 4.67 MIL/MCL (ref 4–5.2)
SODIUM SERPL-SCNC: 140 MMOL/L (ref 135–145)
TRIGL SERPL-MCNC: 151 MG/DL
TSH SERPL-ACNC: 0.57 MCUNITS/ML (ref 0.35–5)
WBC # BLD: 8.7 K/MCL (ref 4.2–11)

## 2024-03-28 RX ORDER — FERROUS SULFATE 325(65) MG
1 TABLET ORAL DAILY
Qty: 90 TABLET | Refills: 0 | Status: SHIPPED | OUTPATIENT
Start: 2024-03-28 | End: 2024-06-26

## 2024-03-29 ENCOUNTER — TELEPHONE (OUTPATIENT)
Dept: FAMILY MEDICINE | Age: 49
End: 2024-03-29

## 2024-04-01 ENCOUNTER — TELEPHONE (OUTPATIENT)
Dept: FAMILY MEDICINE | Age: 49
End: 2024-04-01

## 2024-06-14 DIAGNOSIS — I10 ESSENTIAL HYPERTENSION: ICD-10-CM

## 2024-06-14 DIAGNOSIS — I60.7 CEREBRAL ANEURYSM RUPTURE (HCC): ICD-10-CM

## 2024-06-14 DIAGNOSIS — I63.321 CEREBROVASCULAR ACCIDENT (CVA) DUE TO THROMBOSIS OF RIGHT ANTERIOR CEREBRAL ARTERY (HCC): ICD-10-CM

## 2024-06-14 RX ORDER — METOPROLOL TARTRATE 25 MG/1
TABLET, FILM COATED ORAL 2 TIMES DAILY
Qty: 360 TABLET | Refills: 0 | OUTPATIENT
Start: 2024-06-14

## 2024-06-14 NOTE — TELEPHONE ENCOUNTER
Protocol FAIL    Requesting: metoprolol tartrate 25 MG Oral Tab     LOV: 8/3/22  RTC: 4 WEEKS  Filled: 11/10/22 360 TABLET 0 REFILL  Recent Labs: 9/14/22    Upcoming OV   No future appointments.

## 2025-02-21 RX ORDER — AMLODIPINE BESYLATE 10 MG/1
10 TABLET ORAL DAILY
Qty: 90 TABLET | Refills: 0 | Status: SHIPPED | OUTPATIENT
Start: 2025-02-21

## 2025-03-10 DIAGNOSIS — I10 ESSENTIAL HYPERTENSION: ICD-10-CM

## 2025-03-10 RX ORDER — METOPROLOL TARTRATE 50 MG
50 TABLET ORAL 2 TIMES DAILY
Qty: 60 TABLET | Refills: 0 | Status: SHIPPED | OUTPATIENT
Start: 2025-03-10

## 2025-03-21 ENCOUNTER — TELEPHONE (OUTPATIENT)
Dept: FAMILY MEDICINE | Age: 50
End: 2025-03-21

## 2025-04-24 ENCOUNTER — TELEPHONE (OUTPATIENT)
Dept: FAMILY MEDICINE | Age: 50
End: 2025-04-24

## 2025-04-24 ENCOUNTER — TELEPHONE (OUTPATIENT)
Dept: LAB | Age: 50
End: 2025-04-24